# Patient Record
Sex: MALE | Race: WHITE | NOT HISPANIC OR LATINO | Employment: FULL TIME | URBAN - METROPOLITAN AREA
[De-identification: names, ages, dates, MRNs, and addresses within clinical notes are randomized per-mention and may not be internally consistent; named-entity substitution may affect disease eponyms.]

---

## 2022-04-07 ENCOUNTER — HOSPITAL ENCOUNTER (OUTPATIENT)
Dept: CARDIOLOGY | Facility: HOSPITAL | Age: 42
Discharge: HOME | End: 2022-04-07

## 2022-04-07 ENCOUNTER — HOSPITAL ENCOUNTER (OUTPATIENT)
Dept: GENERAL RADIOLOGY | Facility: HOSPITAL | Age: 42
Discharge: HOME | End: 2022-04-07

## 2022-04-07 ENCOUNTER — LAB (OUTPATIENT)
Dept: LAB | Facility: HOSPITAL | Age: 42
End: 2022-04-07

## 2022-04-07 ENCOUNTER — OFFICE VISIT (OUTPATIENT)
Dept: PODIATRY | Facility: CLINIC | Age: 42
End: 2022-04-07

## 2022-04-07 VITALS
BODY MASS INDEX: 35.28 KG/M2 | SYSTOLIC BLOOD PRESSURE: 112 MMHG | HEIGHT: 71 IN | WEIGHT: 252 LBS | DIASTOLIC BLOOD PRESSURE: 78 MMHG | HEART RATE: 92 BPM

## 2022-04-07 DIAGNOSIS — S82.851A CLOSED DISPLACED TRIMALLEOLAR FRACTURE OF RIGHT ANKLE, INITIAL ENCOUNTER: ICD-10-CM

## 2022-04-07 DIAGNOSIS — M25.571 ACUTE RIGHT ANKLE PAIN: Primary | ICD-10-CM

## 2022-04-07 LAB
ANION GAP SERPL CALCULATED.3IONS-SCNC: 9 MMOL/L (ref 5–15)
BUN SERPL-MCNC: 15 MG/DL (ref 6–20)
BUN/CREAT SERPL: 17.9 (ref 7–25)
CALCIUM SPEC-SCNC: 9.4 MG/DL (ref 8.6–10.5)
CHLORIDE SERPL-SCNC: 104 MMOL/L (ref 98–107)
CO2 SERPL-SCNC: 24 MMOL/L (ref 22–29)
CREAT SERPL-MCNC: 0.84 MG/DL (ref 0.76–1.27)
DEPRECATED RDW RBC AUTO: 42.4 FL (ref 37–54)
EGFRCR SERPLBLD CKD-EPI 2021: 111.7 ML/MIN/1.73
ERYTHROCYTE [DISTWIDTH] IN BLOOD BY AUTOMATED COUNT: 13.1 % (ref 12.3–15.4)
GLUCOSE SERPL-MCNC: 71 MG/DL (ref 65–99)
HCT VFR BLD AUTO: 44.5 % (ref 37.5–51)
HGB BLD-MCNC: 14.6 G/DL (ref 13–17.7)
MCH RBC QN AUTO: 28.8 PG (ref 26.6–33)
MCHC RBC AUTO-ENTMCNC: 32.8 G/DL (ref 31.5–35.7)
MCV RBC AUTO: 87.8 FL (ref 79–97)
MRSA DNA SPEC QL NAA+PROBE: NORMAL
PLATELET # BLD AUTO: 235 10*3/MM3 (ref 140–450)
PMV BLD AUTO: 11.6 FL (ref 6–12)
POTASSIUM SERPL-SCNC: 4.2 MMOL/L (ref 3.5–5.2)
RBC # BLD AUTO: 5.07 10*6/MM3 (ref 4.14–5.8)
SARS-COV-2 RNA PNL SPEC NAA+PROBE: NOT DETECTED
SODIUM SERPL-SCNC: 137 MMOL/L (ref 136–145)
WBC NRBC COR # BLD: 7.08 10*3/MM3 (ref 3.4–10.8)

## 2022-04-07 PROCEDURE — 85027 COMPLETE CBC AUTOMATED: CPT

## 2022-04-07 PROCEDURE — C9803 HOPD COVID-19 SPEC COLLECT: HCPCS

## 2022-04-07 PROCEDURE — 80048 BASIC METABOLIC PNL TOTAL CA: CPT

## 2022-04-07 PROCEDURE — 93005 ELECTROCARDIOGRAM TRACING: CPT | Performed by: PODIATRIST

## 2022-04-07 PROCEDURE — 36415 COLL VENOUS BLD VENIPUNCTURE: CPT

## 2022-04-07 PROCEDURE — U0003 INFECTIOUS AGENT DETECTION BY NUCLEIC ACID (DNA OR RNA); SEVERE ACUTE RESPIRATORY SYNDROME CORONAVIRUS 2 (SARS-COV-2) (CORONAVIRUS DISEASE [COVID-19]), AMPLIFIED PROBE TECHNIQUE, MAKING USE OF HIGH THROUGHPUT TECHNOLOGIES AS DESCRIBED BY CMS-2020-01-R: HCPCS

## 2022-04-07 PROCEDURE — 93010 ELECTROCARDIOGRAM REPORT: CPT | Performed by: INTERNAL MEDICINE

## 2022-04-07 PROCEDURE — 87641 MR-STAPH DNA AMP PROBE: CPT

## 2022-04-07 PROCEDURE — 71046 X-RAY EXAM CHEST 2 VIEWS: CPT

## 2022-04-07 PROCEDURE — 99204 OFFICE O/P NEW MOD 45 MIN: CPT | Performed by: PODIATRIST

## 2022-04-07 RX ORDER — B-COMPLEX WITH VITAMIN C
TABLET ORAL
COMMUNITY

## 2022-04-07 RX ORDER — MULTIVIT WITH MINERALS/LUTEIN
250 TABLET ORAL DAILY
COMMUNITY

## 2022-04-07 NOTE — PROGRESS NOTES
04/07/2022  Foot and Ankle Surgery - New Patient   Provider: Dr. David Taylor DPM  Location: [facility]    Subjective:  Munir Cuadra is a 41 y.o. male.     Chief Complaint   Patient presents with   • Right Ankle - Pain   • Initial Evaluation     No pcp       HPI: Patient is a 41-year-old male that presents after a workplace injury last week.  He states that last Wednesday he slipped and landed awkwardly involving his right lower extremity.  He describes twisting injury.  He did report to the urgent care center where imaging was performed showing fracture involving the fibula.  He was placed into a cam boot and referred to me.  Patient has been mostly off weightbearing with crutch assistance.  He continues to have discomfort involving the ankle and lower leg.  He has noticed some improvement in pain.  Denies any previous injuries involving the right lower extremity.  Majority of his discomfort is located to the lateral aspect of the ankle.    Allergies   Allergen Reactions   • Tramadol Itching   • Erythromycin GI Intolerance       Past Medical History:   Diagnosis Date   • Asthma        History reviewed. No pertinent surgical history.    Family History   Problem Relation Age of Onset   • No Known Problems Mother    • No Known Problems Father        Social History     Socioeconomic History   • Marital status:    Tobacco Use   • Smoking status: Never Smoker   • Smokeless tobacco: Never Used   • Tobacco comment: nopassive exp   Vaping Use   • Vaping Use: Never used   Substance and Sexual Activity   • Alcohol use: Never   • Drug use: Never   • Sexual activity: Defer        Current Outpatient Medications on File Prior to Visit   Medication Sig Dispense Refill   • CBD (cannabidiol) oral oil Take  by mouth.     • Cetirizine HCl 10 MG capsule Take 10 mg by mouth Daily.     • Cholecalciferol 25 MCG (1000 UT) capsule Take 1,000 Units by mouth Daily.     • esomeprazole (NexIUM) 20 MG packet Take 20 mg by  "mouth Daily.     • fluticasone (FLONASE) 50 MCG/ACT nasal spray 2 sprays into the nostril(s) as directed by provider Daily. 16 g 0   • KRILL OIL PO Take  by mouth.     • Magnesium 100 MG capsule Take  by mouth.     • multivitamin (THERAGRAN) tablet tablet Take 1 tablet by mouth Daily.     • vitamin C (ASCORBIC ACID) 250 MG tablet Take 250 mg by mouth Daily.     • Zinc 100 MG tablet Take  by mouth.       No current facility-administered medications on file prior to visit.       Review of Systems:  General: Denies fever, chills, fatigue, and weakness.  Eyes: Denies vision loss, blurry vision, and excessive redness.  ENT: Denies hearing issues and difficulty swallowing.  Cardiovascular: Denies palpitations, chest pain, or syncopal episodes.  Respiratory: Denies shortness of breath, wheezing, and coughing.  GI: Denies abdominal pain, nausea, and vomiting.   : Denies frequency, hematuria, and urgency.  Musculoskeletal: + Ankle pain after injury  Derm: Denies rash, open wounds, or suspicious lesions.  Neuro: Denies headaches, numbness, loss of coordination, and tremors.  Psych: Denies anxiety and depression.  Endocrine: Denies temperature intolerance and changes in appetite.  Heme: Denies bleeding disorders or abnormal bruising.     Objective   /78   Pulse 92   Ht 180.3 cm (71\")   Wt 114 kg (252 lb)   BMI 35.15 kg/m²     Foot/Ankle Exam:       General:   Appearance: appears stated age and healthy    Orientation: AAOx3    Affect: appropriate    Assistance: crutches    Shoe Gear:  CAM boot    VASCULAR      Right Foot Vascularity   Normal vascular exam    Dorsalis pedis:  2+  Posterior tibial:  2+  Skin Temperature: warm    Edema Gradin+  CFT:  < 3 seconds  Pedal Hair Growth:  Present  Varicosities: none        NEUROLOGIC     Right Foot Neurologic   Light touch sensation:  Normal  Hot/Cold sensation: normal    Achilles reflex:  2+     MUSCULOSKELETAL      Right Foot Musculoskeletal   Ecchymosis:  Dorsal foot " and ankle joint (Lower leg ecchymosis)  Tenderness: dorsal foot, lateral malleolus and anterior tibiotalar joint line    Arch:  Normal     DERMATOLOGIC     Right Foot Dermatologic   Skin: skin intact        Right Foot Additional Comments Muscle strength, range of motion, and instability testing deferred secondary to guarding.  No resting deformity.  Swelling and inflammation to the ankle region.  No proximal fibular pain.      Assessment/Plan   Diagnoses and all orders for this visit:    1. Acute right ankle pain (Primary)    2. Closed displaced trimalleolar fracture of right ankle, initial encounter  -     COVID PRE-OP / PRE-PROCEDURE SCREENING ORDER (NO ISOLATION) - Swab, Nasopharynx; Future  -     CBC (No Diff); Future  -     Basic Metabolic Panel; Future  -     ECG 12 Lead; Future  -     XR Chest 2 View; Future  -     Case Request; Standing  -     Case Request    Other orders  -     Follow Anesthesia Guidelines / Standing Orders; Future  -     Obtain Informed Consent; Future  -     Provide NPO Instructions to Patient; Future  -     Chlorhexidine Skin Prep; Future      Patient presents after recent injury involving the right lower extremity at work.  Imaging was independently reviewed showing spiral type fracture involving the lateral malleolus along with small posterior malleolar fragment as well as gapping involving the medial gutter indicating deltoid insufficiency.  Findings are consistent with trimalleolar ankle fracture equivalent.  I did discuss the imaging, diagnosis, and treatment options with the patient.  Given displacement and unstable nature of the fracture, I do feel that the most appropriate option is to proceed with open reduction internal fixation.  I did review the procedure, risk, goals, and recovery with patient at length.  He understands that he will require nonweightbearing and time off work.  He understands and agrees and would like to proceed in the near future.  All questions were  answered to patient satisfaction.  Greater than 45 minutes was spent before, during, and after evaluation for patient care.    Orders Placed This Encounter   Procedures   • COVID PRE-OP / PRE-PROCEDURE SCREENING ORDER (NO ISOLATION) - Swab, Nasopharynx     Standing Status:   Future     Standing Expiration Date:   4/7/2023     Order Specific Question:   Please select your location:     Answer:   [facility]     Order Specific Question:   COVID Screening Order:     Answer:   In-House: EMERGENT/PREOP-CEPHEID, 3-4 HR TAT [[id number]]     Order Specific Question:   Previously tested for COVID-19?     Answer:   Unknown     Order Specific Question:   Employed in healthcare setting?     Answer:   No     Order Specific Question:   Symptomatic for COVID-19 as defined by CDC?     Answer:   No     Order Specific Question:   Hospitalized for COVID-19?     Answer:   No     Order Specific Question:   Admitted to ICU for COVID-19?     Answer:   No     Order Specific Question:   Resident in a congregate (group) care setting?     Answer:   No     Order Specific Question:   Release to patient     Answer:   Immediate   • XR Chest 2 View     Standing Status:   Future     Standing Expiration Date:   4/7/2023     Order Specific Question:   Reason for Exam:     Answer:   Preop   • CBC (No Diff)     Standing Status:   Future     Standing Expiration Date:   4/7/2023     Order Specific Question:   Release to patient     Answer:   Immediate   • Basic Metabolic Panel     Standing Status:   Future     Standing Expiration Date:   4/7/2023     Order Specific Question:   Release to patient     Answer:   Immediate   • Follow Anesthesia Guidelines / Standing Orders     Standing Status:   Future   • Obtain Informed Consent     Standing Status:   Future     Order Specific Question:   Informed Consent Given For     Answer:   Open reduction and internal fixation of trimalleolar ankle fracture equivalent and possible syndesmotic repair to the right lower  extremity   • Provide NPO Instructions to Patient     Standing Status:   Future   • Chlorhexidine Skin Prep     Chlorhexidine Skin Prep and Instructions For All Patients Having A Procedure Requiring an Outward Incision if Not Allergic. If Allergic, Give Antibacterial Skin Wipes and Instructions. Do Not Use For Facial Cases or on Any Mucus Membranes.     Standing Status:   Future   • ECG 12 Lead     Standing Status:   Future     Standing Expiration Date:   4/7/2023     Order Specific Question:   Reason for Exam:     Answer:   Preop        Note is dictated utilizing voice recognition software. Unfortunately this leads to occasional typographical errors. I apologize in advance if the situation occurs. If questions occur please do not hesitate to call our office.

## 2022-04-07 NOTE — H&P (VIEW-ONLY)
04/07/2022  Foot and Ankle Surgery - New Patient   Provider: Dr. David Taylor DPM  Location: [facility]    Subjective:  Munir Cuadra is a 41 y.o. male.     Chief Complaint   Patient presents with   • Right Ankle - Pain   • Initial Evaluation     No pcp       HPI: Patient is a 41-year-old male that presents after a workplace injury last week.  He states that last Wednesday he slipped and landed awkwardly involving his right lower extremity.  He describes twisting injury.  He did report to the urgent care center where imaging was performed showing fracture involving the fibula.  He was placed into a cam boot and referred to me.  Patient has been mostly off weightbearing with crutch assistance.  He continues to have discomfort involving the ankle and lower leg.  He has noticed some improvement in pain.  Denies any previous injuries involving the right lower extremity.  Majority of his discomfort is located to the lateral aspect of the ankle.    Allergies   Allergen Reactions   • Tramadol Itching   • Erythromycin GI Intolerance       Past Medical History:   Diagnosis Date   • Asthma        History reviewed. No pertinent surgical history.    Family History   Problem Relation Age of Onset   • No Known Problems Mother    • No Known Problems Father        Social History     Socioeconomic History   • Marital status:    Tobacco Use   • Smoking status: Never Smoker   • Smokeless tobacco: Never Used   • Tobacco comment: nopassive exp   Vaping Use   • Vaping Use: Never used   Substance and Sexual Activity   • Alcohol use: Never   • Drug use: Never   • Sexual activity: Defer        Current Outpatient Medications on File Prior to Visit   Medication Sig Dispense Refill   • CBD (cannabidiol) oral oil Take  by mouth.     • Cetirizine HCl 10 MG capsule Take 10 mg by mouth Daily.     • Cholecalciferol 25 MCG (1000 UT) capsule Take 1,000 Units by mouth Daily.     • esomeprazole (NexIUM) 20 MG packet Take 20 mg by  "mouth Daily.     • fluticasone (FLONASE) 50 MCG/ACT nasal spray 2 sprays into the nostril(s) as directed by provider Daily. 16 g 0   • KRILL OIL PO Take  by mouth.     • Magnesium 100 MG capsule Take  by mouth.     • multivitamin (THERAGRAN) tablet tablet Take 1 tablet by mouth Daily.     • vitamin C (ASCORBIC ACID) 250 MG tablet Take 250 mg by mouth Daily.     • Zinc 100 MG tablet Take  by mouth.       No current facility-administered medications on file prior to visit.       Review of Systems:  General: Denies fever, chills, fatigue, and weakness.  Eyes: Denies vision loss, blurry vision, and excessive redness.  ENT: Denies hearing issues and difficulty swallowing.  Cardiovascular: Denies palpitations, chest pain, or syncopal episodes.  Respiratory: Denies shortness of breath, wheezing, and coughing.  GI: Denies abdominal pain, nausea, and vomiting.   : Denies frequency, hematuria, and urgency.  Musculoskeletal: + Ankle pain after injury  Derm: Denies rash, open wounds, or suspicious lesions.  Neuro: Denies headaches, numbness, loss of coordination, and tremors.  Psych: Denies anxiety and depression.  Endocrine: Denies temperature intolerance and changes in appetite.  Heme: Denies bleeding disorders or abnormal bruising.     Objective   /78   Pulse 92   Ht 180.3 cm (71\")   Wt 114 kg (252 lb)   BMI 35.15 kg/m²     Foot/Ankle Exam:       General:   Appearance: appears stated age and healthy    Orientation: AAOx3    Affect: appropriate    Assistance: crutches    Shoe Gear:  CAM boot    VASCULAR      Right Foot Vascularity   Normal vascular exam    Dorsalis pedis:  2+  Posterior tibial:  2+  Skin Temperature: warm    Edema Gradin+  CFT:  < 3 seconds  Pedal Hair Growth:  Present  Varicosities: none        NEUROLOGIC     Right Foot Neurologic   Light touch sensation:  Normal  Hot/Cold sensation: normal    Achilles reflex:  2+     MUSCULOSKELETAL      Right Foot Musculoskeletal   Ecchymosis:  Dorsal foot " and ankle joint (Lower leg ecchymosis)  Tenderness: dorsal foot, lateral malleolus and anterior tibiotalar joint line    Arch:  Normal     DERMATOLOGIC     Right Foot Dermatologic   Skin: skin intact        Right Foot Additional Comments Muscle strength, range of motion, and instability testing deferred secondary to guarding.  No resting deformity.  Swelling and inflammation to the ankle region.  No proximal fibular pain.      Assessment/Plan   Diagnoses and all orders for this visit:    1. Acute right ankle pain (Primary)    2. Closed displaced trimalleolar fracture of right ankle, initial encounter  -     COVID PRE-OP / PRE-PROCEDURE SCREENING ORDER (NO ISOLATION) - Swab, Nasopharynx; Future  -     CBC (No Diff); Future  -     Basic Metabolic Panel; Future  -     ECG 12 Lead; Future  -     XR Chest 2 View; Future  -     Case Request; Standing  -     Case Request    Other orders  -     Follow Anesthesia Guidelines / Standing Orders; Future  -     Obtain Informed Consent; Future  -     Provide NPO Instructions to Patient; Future  -     Chlorhexidine Skin Prep; Future      Patient presents after recent injury involving the right lower extremity at work.  Imaging was independently reviewed showing spiral type fracture involving the lateral malleolus along with small posterior malleolar fragment as well as gapping involving the medial gutter indicating deltoid insufficiency.  Findings are consistent with trimalleolar ankle fracture equivalent.  I did discuss the imaging, diagnosis, and treatment options with the patient.  Given displacement and unstable nature of the fracture, I do feel that the most appropriate option is to proceed with open reduction internal fixation.  I did review the procedure, risk, goals, and recovery with patient at length.  He understands that he will require nonweightbearing and time off work.  He understands and agrees and would like to proceed in the near future.  All questions were  answered to patient satisfaction.  Greater than 45 minutes was spent before, during, and after evaluation for patient care.    Orders Placed This Encounter   Procedures   • COVID PRE-OP / PRE-PROCEDURE SCREENING ORDER (NO ISOLATION) - Swab, Nasopharynx     Standing Status:   Future     Standing Expiration Date:   4/7/2023     Order Specific Question:   Please select your location:     Answer:   [facility]     Order Specific Question:   COVID Screening Order:     Answer:   In-House: EMERGENT/PREOP-CEPHEID, 3-4 HR TAT [[id number]]     Order Specific Question:   Previously tested for COVID-19?     Answer:   Unknown     Order Specific Question:   Employed in healthcare setting?     Answer:   No     Order Specific Question:   Symptomatic for COVID-19 as defined by CDC?     Answer:   No     Order Specific Question:   Hospitalized for COVID-19?     Answer:   No     Order Specific Question:   Admitted to ICU for COVID-19?     Answer:   No     Order Specific Question:   Resident in a congregate (group) care setting?     Answer:   No     Order Specific Question:   Release to patient     Answer:   Immediate   • XR Chest 2 View     Standing Status:   Future     Standing Expiration Date:   4/7/2023     Order Specific Question:   Reason for Exam:     Answer:   Preop   • CBC (No Diff)     Standing Status:   Future     Standing Expiration Date:   4/7/2023     Order Specific Question:   Release to patient     Answer:   Immediate   • Basic Metabolic Panel     Standing Status:   Future     Standing Expiration Date:   4/7/2023     Order Specific Question:   Release to patient     Answer:   Immediate   • Follow Anesthesia Guidelines / Standing Orders     Standing Status:   Future   • Obtain Informed Consent     Standing Status:   Future     Order Specific Question:   Informed Consent Given For     Answer:   Open reduction and internal fixation of trimalleolar ankle fracture equivalent and possible syndesmotic repair to the right lower  extremity   • Provide NPO Instructions to Patient     Standing Status:   Future   • Chlorhexidine Skin Prep     Chlorhexidine Skin Prep and Instructions For All Patients Having A Procedure Requiring an Outward Incision if Not Allergic. If Allergic, Give Antibacterial Skin Wipes and Instructions. Do Not Use For Facial Cases or on Any Mucus Membranes.     Standing Status:   Future   • ECG 12 Lead     Standing Status:   Future     Standing Expiration Date:   4/7/2023     Order Specific Question:   Reason for Exam:     Answer:   Preop        Note is dictated utilizing voice recognition software. Unfortunately this leads to occasional typographical errors. I apologize in advance if the situation occurs. If questions occur please do not hesitate to call our office.

## 2022-04-08 ENCOUNTER — HOSPITAL ENCOUNTER (OUTPATIENT)
Facility: HOSPITAL | Age: 42
Setting detail: HOSPITAL OUTPATIENT SURGERY
Discharge: HOME | End: 2022-04-08
Attending: PODIATRIST | Admitting: PODIATRIST

## 2022-04-08 ENCOUNTER — APPOINTMENT (OUTPATIENT)
Dept: GENERAL RADIOLOGY | Facility: HOSPITAL | Age: 42
End: 2022-04-08

## 2022-04-08 ENCOUNTER — ANESTHESIA EVENT (OUTPATIENT)
Dept: PERIOP | Facility: HOSPITAL | Age: 42
End: 2022-04-08

## 2022-04-08 ENCOUNTER — ANESTHESIA (OUTPATIENT)
Dept: PERIOP | Facility: HOSPITAL | Age: 42
End: 2022-04-08

## 2022-04-08 VITALS
BODY MASS INDEX: 36.08 KG/M2 | DIASTOLIC BLOOD PRESSURE: 89 MMHG | WEIGHT: 252 LBS | SYSTOLIC BLOOD PRESSURE: 134 MMHG | TEMPERATURE: 97.4 F | HEART RATE: 93 BPM | HEIGHT: 70 IN | RESPIRATION RATE: 16 BRPM | OXYGEN SATURATION: 92 %

## 2022-04-08 DIAGNOSIS — S82.851A CLOSED DISPLACED TRIMALLEOLAR FRACTURE OF RIGHT ANKLE, INITIAL ENCOUNTER: ICD-10-CM

## 2022-04-08 PROCEDURE — 27822 TREATMENT OF ANKLE FRACTURE: CPT | Performed by: PODIATRIST

## 2022-04-08 PROCEDURE — 76000 FLUOROSCOPY <1 HR PHYS/QHP: CPT | Performed by: PODIATRIST

## 2022-04-08 PROCEDURE — 25010000002 PROPOFOL 200 MG/20ML EMULSION: Performed by: ANESTHESIOLOGIST ASSISTANT

## 2022-04-08 PROCEDURE — C1713 ANCHOR/SCREW BN/BN,TIS/BN: HCPCS | Performed by: PODIATRIST

## 2022-04-08 PROCEDURE — 25010000002 ROPIVACAINE PER 1 MG: Performed by: ANESTHESIOLOGY

## 2022-04-08 PROCEDURE — 76942 ECHO GUIDE FOR BIOPSY: CPT | Performed by: PODIATRIST

## 2022-04-08 PROCEDURE — 25010000002 MIDAZOLAM PER 1 MG: Performed by: ANESTHESIOLOGY

## 2022-04-08 PROCEDURE — 73600 X-RAY EXAM OF ANKLE: CPT

## 2022-04-08 PROCEDURE — 25010000002 ONDANSETRON PER 1 MG: Performed by: ANESTHESIOLOGIST ASSISTANT

## 2022-04-08 PROCEDURE — 76000 FLUOROSCOPY <1 HR PHYS/QHP: CPT

## 2022-04-08 PROCEDURE — 25010000002 FENTANYL CITRATE (PF) 50 MCG/ML SOLUTION: Performed by: ANESTHESIOLOGY

## 2022-04-08 PROCEDURE — 25010000002 DEXAMETHASONE PER 1 MG: Performed by: ANESTHESIOLOGIST ASSISTANT

## 2022-04-08 PROCEDURE — 25010000002 FENTANYL CITRATE (PF) 100 MCG/2ML SOLUTION: Performed by: ANESTHESIOLOGIST ASSISTANT

## 2022-04-08 PROCEDURE — 25010000002 KETOROLAC TROMETHAMINE PER 15 MG: Performed by: ANESTHESIOLOGIST ASSISTANT

## 2022-04-08 PROCEDURE — 27768 CLTX POST ANKLE FX W/MNPJ: CPT | Performed by: PODIATRIST

## 2022-04-08 DEVICE — IMPLANTABLE DEVICE
Type: IMPLANTABLE DEVICE | Site: ANKLE | Status: FUNCTIONAL
Brand: ORTHOLOC 3DI

## 2022-04-08 DEVICE — IMPLANTABLE DEVICE
Type: IMPLANTABLE DEVICE | Site: ANKLE | Status: FUNCTIONAL
Brand: DART-FIRE

## 2022-04-08 DEVICE — IMPLANTABLE DEVICE
Type: IMPLANTABLE DEVICE | Site: ANKLE | Status: FUNCTIONAL
Brand: ORTHOLOC 3DI PLATING SYSTEM

## 2022-04-08 DEVICE — K-WIRE BLUNT/TROCAR: Type: IMPLANTABLE DEVICE | Site: ANKLE | Status: FUNCTIONAL

## 2022-04-08 DEVICE — IMPLANTABLE DEVICE
Type: IMPLANTABLE DEVICE | Site: ANKLE | Status: FUNCTIONAL
Brand: ORTHOLOC

## 2022-04-08 RX ORDER — DEXAMETHASONE SODIUM PHOSPHATE 4 MG/ML
INJECTION, SOLUTION INTRA-ARTICULAR; INTRALESIONAL; INTRAMUSCULAR; INTRAVENOUS; SOFT TISSUE AS NEEDED
Status: DISCONTINUED | OUTPATIENT
Start: 2022-04-08 | End: 2022-04-08 | Stop reason: SURG

## 2022-04-08 RX ORDER — IPRATROPIUM BROMIDE AND ALBUTEROL SULFATE 2.5; .5 MG/3ML; MG/3ML
3 SOLUTION RESPIRATORY (INHALATION) ONCE AS NEEDED
Status: DISCONTINUED | OUTPATIENT
Start: 2022-04-08 | End: 2022-04-08 | Stop reason: HOSPADM

## 2022-04-08 RX ORDER — SODIUM CHLORIDE, SODIUM LACTATE, POTASSIUM CHLORIDE, CALCIUM CHLORIDE 600; 310; 30; 20 MG/100ML; MG/100ML; MG/100ML; MG/100ML
1000 INJECTION, SOLUTION INTRAVENOUS CONTINUOUS
Status: DISCONTINUED | OUTPATIENT
Start: 2022-04-08 | End: 2022-04-08 | Stop reason: HOSPADM

## 2022-04-08 RX ORDER — IBUPROFEN 800 MG/1
800 TABLET ORAL EVERY 6 HOURS PRN
COMMUNITY

## 2022-04-08 RX ORDER — FENTANYL CITRATE 50 UG/ML
INJECTION, SOLUTION INTRAMUSCULAR; INTRAVENOUS AS NEEDED
Status: DISCONTINUED | OUTPATIENT
Start: 2022-04-08 | End: 2022-04-08 | Stop reason: SURG

## 2022-04-08 RX ORDER — NALOXONE HCL 0.4 MG/ML
0.4 VIAL (ML) INJECTION AS NEEDED
Status: DISCONTINUED | OUTPATIENT
Start: 2022-04-08 | End: 2022-04-08 | Stop reason: HOSPADM

## 2022-04-08 RX ORDER — DEXAMETHASONE SODIUM PHOSPHATE 4 MG/ML
INJECTION, SOLUTION INTRA-ARTICULAR; INTRALESIONAL; INTRAMUSCULAR; INTRAVENOUS; SOFT TISSUE
Status: DISCONTINUED | OUTPATIENT
Start: 2022-04-08 | End: 2022-04-08 | Stop reason: SURG

## 2022-04-08 RX ORDER — ROPIVACAINE HYDROCHLORIDE 5 MG/ML
INJECTION, SOLUTION EPIDURAL; INFILTRATION; PERINEURAL
Status: DISCONTINUED | OUTPATIENT
Start: 2022-04-08 | End: 2022-04-08 | Stop reason: SURG

## 2022-04-08 RX ORDER — PROPOFOL 10 MG/ML
INJECTION, EMULSION INTRAVENOUS AS NEEDED
Status: DISCONTINUED | OUTPATIENT
Start: 2022-04-08 | End: 2022-04-08 | Stop reason: SURG

## 2022-04-08 RX ORDER — LIDOCAINE HYDROCHLORIDE 10 MG/ML
INJECTION, SOLUTION EPIDURAL; INFILTRATION; INTRACAUDAL; PERINEURAL AS NEEDED
Status: DISCONTINUED | OUTPATIENT
Start: 2022-04-08 | End: 2022-04-08 | Stop reason: SURG

## 2022-04-08 RX ORDER — ONDANSETRON 2 MG/ML
INJECTION INTRAMUSCULAR; INTRAVENOUS AS NEEDED
Status: DISCONTINUED | OUTPATIENT
Start: 2022-04-08 | End: 2022-04-08 | Stop reason: SURG

## 2022-04-08 RX ORDER — MIDAZOLAM HYDROCHLORIDE 1 MG/ML
INJECTION INTRAMUSCULAR; INTRAVENOUS
Status: DISCONTINUED | OUTPATIENT
Start: 2022-04-08 | End: 2022-04-08 | Stop reason: SURG

## 2022-04-08 RX ORDER — FENTANYL CITRATE 50 UG/ML
INJECTION, SOLUTION INTRAMUSCULAR; INTRAVENOUS
Status: DISCONTINUED | OUTPATIENT
Start: 2022-04-08 | End: 2022-04-08 | Stop reason: SURG

## 2022-04-08 RX ORDER — SODIUM CHLORIDE 0.9 % (FLUSH) 0.9 %
10 SYRINGE (ML) INJECTION AS NEEDED
Status: DISCONTINUED | OUTPATIENT
Start: 2022-04-08 | End: 2022-04-08 | Stop reason: HOSPADM

## 2022-04-08 RX ORDER — ONDANSETRON 2 MG/ML
4 INJECTION INTRAMUSCULAR; INTRAVENOUS ONCE AS NEEDED
Status: DISCONTINUED | OUTPATIENT
Start: 2022-04-08 | End: 2022-04-08 | Stop reason: HOSPADM

## 2022-04-08 RX ORDER — BUDESONIDE AND FORMOTEROL FUMARATE DIHYDRATE 160; 4.5 UG/1; UG/1
2 AEROSOL RESPIRATORY (INHALATION)
COMMUNITY

## 2022-04-08 RX ORDER — KETOROLAC TROMETHAMINE 30 MG/ML
INJECTION, SOLUTION INTRAMUSCULAR; INTRAVENOUS AS NEEDED
Status: DISCONTINUED | OUTPATIENT
Start: 2022-04-08 | End: 2022-04-08 | Stop reason: SURG

## 2022-04-08 RX ORDER — HYDROCODONE BITARTRATE AND ACETAMINOPHEN 7.5; 325 MG/1; MG/1
1 TABLET ORAL EVERY 6 HOURS PRN
Qty: 28 TABLET | Refills: 0 | Status: SHIPPED | OUTPATIENT
Start: 2022-04-08 | End: 2022-05-09

## 2022-04-08 RX ADMIN — SODIUM CHLORIDE, POTASSIUM CHLORIDE, SODIUM LACTATE AND CALCIUM CHLORIDE 1000 ML: 600; 310; 30; 20 INJECTION, SOLUTION INTRAVENOUS at 09:25

## 2022-04-08 RX ADMIN — KETOROLAC TROMETHAMINE 30 MG: 30 INJECTION, SOLUTION INTRAMUSCULAR at 11:45

## 2022-04-08 RX ADMIN — ONDANSETRON 4 MG: 2 INJECTION INTRAMUSCULAR; INTRAVENOUS at 11:30

## 2022-04-08 RX ADMIN — CEFAZOLIN SODIUM 2 G: 1 INJECTION, POWDER, FOR SOLUTION INTRAMUSCULAR; INTRAVENOUS at 10:46

## 2022-04-08 RX ADMIN — FENTANYL CITRATE 100 MCG: 50 INJECTION, SOLUTION INTRAMUSCULAR; INTRAVENOUS at 10:24

## 2022-04-08 RX ADMIN — ROPIVACAINE HYDROCHLORIDE 60 ML: 5 INJECTION EPIDURAL; INFILTRATION; PERINEURAL at 10:24

## 2022-04-08 RX ADMIN — FENTANYL CITRATE 50 MCG: 50 INJECTION INTRAMUSCULAR; INTRAVENOUS at 10:51

## 2022-04-08 RX ADMIN — PROPOFOL 200 MG: 10 INJECTION, EMULSION INTRAVENOUS at 10:42

## 2022-04-08 RX ADMIN — DEXAMETHASONE SODIUM PHOSPHATE 8 MG: 4 INJECTION, SOLUTION INTRA-ARTICULAR; INTRALESIONAL; INTRAMUSCULAR; INTRAVENOUS; SOFT TISSUE at 10:24

## 2022-04-08 RX ADMIN — DEXAMETHASONE SODIUM PHOSPHATE 4 MG: 4 INJECTION, SOLUTION INTRAMUSCULAR; INTRAVENOUS at 10:46

## 2022-04-08 RX ADMIN — FENTANYL CITRATE 50 MCG: 50 INJECTION INTRAMUSCULAR; INTRAVENOUS at 10:53

## 2022-04-08 RX ADMIN — MIDAZOLAM 2 MG: 1 INJECTION INTRAMUSCULAR; INTRAVENOUS at 10:24

## 2022-04-08 RX ADMIN — LIDOCAINE HYDROCHLORIDE 30 MG: 10 INJECTION, SOLUTION EPIDURAL; INFILTRATION; INTRACAUDAL; PERINEURAL at 10:42

## 2022-04-08 NOTE — ANESTHESIA POSTPROCEDURE EVALUATION
Patient: Munir Cuadra    Procedure Summary     Date: 04/08/22 Room / Location: [facility] OR 08 / [facility] MAIN OR    Anesthesia Start: 1034 Anesthesia Stop: 1152    Procedure: ANKLE OPEN REDUCTION INTERNAL FIXATION (Right Ankle) Diagnosis:       Closed displaced trimalleolar fracture of right ankle, initial encounter      (Closed displaced trimalleolar fracture of right ankle, initial encounter [S82.351K])    Surgeons: DREA Taylor DPM Provider: Femi Holt MD    Anesthesia Type: general with block ASA Status: 2          Anesthesia Type: general with block    Vitals  Vitals Value Taken Time   /89 04/08/22 1238   Temp 97.6 °F (36.4 °C) 04/08/22 1236   Pulse 97 04/08/22 1239   Resp 15 04/08/22 1236   SpO2 95 % 04/08/22 1239   Vitals shown include unvalidated device data.        Post Anesthesia Care and Evaluation    Patient location during evaluation: PACU  Patient participation: complete - patient participated  Level of consciousness: awake  Pain management: adequate  Airway patency: patent  Anesthetic complications: No anesthetic complications  PONV Status: none  Cardiovascular status: acceptable  Respiratory status: acceptable  Hydration status: acceptable    Comments: Patient seen and examined postoperatively; vital signs stable; SpO2 greater than or equal to 90%; cardiopulmonary status stable; nausea/vomiting adequately controlled; pain adequately controlled; no apparent anesthesia complications; patient discharged from anesthesia care when discharge criteria were met

## 2022-04-08 NOTE — OP NOTE
Operative Note   Foot and Ankle Surgery   Provider: Dr. David Taylor   Location: [facility]      Procedure:  1.  Open reduction internal fixation lateral malleolus fracture, right  2.  Closed reduction with manipulation of posterior malleolar fracture, right    Pre-operative Diagnosis:   1.  Closed displaced trimalleolar ankle fracture equivalent, right    Post-operative Diagnosis: Same    Surgeon: David Taylor    Assistant: None    Anesthesia: General with block    Implants: [employer] plate and 3.5 millimeter screws of various lengths; 3.5 mm cannulated screws x2    Findings: Primary spiral type fracture involving the lateral malleolus with significant Wagstaffe type fracture and mild comminution.  Posterior malleolus fracture is well reduced without the need for fixation.  No obvious deltoid or syndesmotic disruption    Specimen: None    Blood Loss: Less than 5cc    Complications: None    Post Op Plan: Discharge home.  Strict nonweightbearing activity.  Follow-up with me in 2 weeks    Summary:    Patient is a 41-year-old male that has been seen in office for injury involving his right lower extremity that he sustained at work.  Imaging was reviewed showing mildly displaced trimalleolar ankle fracture equivalent.  I did review the imaging, diagnosis, and treatment options.  We did discuss proceeding with open reduction internal fixation of the ankle fracture.  He understands that he will require nonweightbearing and prolonged decreased activity after surgery.  Patient would like to proceed.    Procedure, risks, complications, and goals were discussed with the patient at bedside.  Risks include but are not limited to infection, complications from anesthesia (including death), chronic pain or numbness, hematoma/seroma, deep vein thrombosis, wound complications, and potential for additional surgical procedures.  Patient understands and elects to proceed with surgery at this time. Informed consent was  obtained before proceeding to the operating suite.  All questions were answered to the patient's satisfaction. No guarantees or assurances were given or implied.    Procedure:    Patient was brought to the operating room placed on operative table in supine position.  Once adequate general anesthesia was administered, a pneumatic tourniquet was placed about the patient's right thigh.  The right lower extremity was scrubbed prepped and draped in usual sterile fashion.  The limb was elevated and exsanguinated and the pneumatic tourniquet was inflated to 300 mmHg.  A formal timeout was conducted prior to incision.    Attention was then directed to the lateral aspect of the ankle.  A linear longitudinal incision was performed over the midline of the distal fibula.  Incision was performed in layers giving adequate visualization of the fracture site.  Vital structures were identified and preserved.  The fracture was evaluated which showed the primary spiral type fracture ending at the ankle joint.  There was mild comminution involving the distal aspect of the lateral malleolus consistent with a Avila type fracture.  The fracture hematoma was evacuated.  The fracture fragments were manipulated and maintained in reduction.  Definitive fixation was achieved utilizing a 3.5 mm cannulated screw from posterior to anterior orientation across the primary fracture.  A secondary cannulated screw was placed from anterior to posterior across the Wagstaffe fracture.  Imaging was performed showing adequate reduction.  The fixation was neutralized with a distal contoured locking plate.  The plate was secured to bone utilizing locking and nonlocking 3.5 millimeter screws of various lengths.  Imaging was performed showing excellent reduction of the lateral malleolus fracture.  Manipulation of the lateral malleolus has allowed for approximation of the posterior malleolar fragment.  No fixation is required.  Under live fluoroscopy, both  the syndesmosis and the deltoid ligament were stressed showing no deficit and no need for repair.  The wound was irrigated with copious amounts of normal saline.  Deep structures were closed with a 2-0 Vicryl in a simple interrupted manner.  The subcutaneous tissues were closed with 4-0 Vicryl and the skin was repaired with staples.  The incision was dressed with Xeroform and sterile compressive dressings.  The ankle was released and a prompt hyperemic response was noted to all digits of the right lower extremity.  The limb was placed into a well-padded posterior splint.  Patient tolerated the procedure and anesthesia well.  He was transferred from the operating room to the recovery room with vital signs stable and neuro vas test unchanged to the right lower extremity.      Dr. David Taylor, VEENA  [facility]  [phone]    Note is dictated utilizing voice recognition software. Unfortunately this leads to occasional typographical errors. I apologize in advance if the situation occurs. If questions occur please do not hesitate to call our office.

## 2022-04-08 NOTE — ANESTHESIA PROCEDURE NOTES
Airway  Urgency: elective    Date/Time: 4/8/2022 10:43 AM  End Time:4/8/2022 10:43 AM    General Information and Staff    Patient location during procedure: OR    Indications and Patient Condition  Indications for airway management: airway protection    Preoxygenated: yes  MILS maintained throughout  Mask difficulty assessment: 0 - not attempted    Final Airway Details  Final airway type: supraglottic airway      Successful airway: classic  Size 4    Number of attempts at approach: 1  Assessment: lips, teeth, and gum same as pre-op and atraumatic intubation

## 2022-04-08 NOTE — ANESTHESIA PREPROCEDURE EVALUATION
Anesthesia Evaluation     Patient summary reviewed   NPO Solid Status: > 8 hours  NPO Liquid Status: > 8 hours           Airway   Mallampati: II  TM distance: >3 FB  Neck ROM: full  No difficulty expected  Dental - normal exam     Pulmonary - normal exam   (+) asthma,  Cardiovascular - normal exam  Exercise tolerance: good (4-7 METS)        Neuro/Psych  GI/Hepatic/Renal/Endo    (+)  GERD,      Musculoskeletal     Abdominal  - normal exam    Bowel sounds: normal.   Substance History      OB/GYN          Other   arthritis,                      Anesthesia Plan    ASA 2     general with block     intravenous induction     Anesthetic plan, all risks, benefits, and alternatives have been provided, discussed and informed consent has been obtained with: patient.        CODE STATUS:

## 2022-04-08 NOTE — ANESTHESIA PROCEDURE NOTES
Peripheral Block      Patient reassessed immediately prior to procedure    Patient location during procedure: pre-op  Start time: 4/8/2022 10:10 AM  Stop time: 4/8/2022 10:25 AM  Reason for block: at surgeon's request and post-op pain management  Performed by  Anesthesiologist: Femi Holt MD  Assisted by: Natanael Church RN  Preanesthetic Checklist  Completed: patient identified, IV checked, site marked, risks and benefits discussed, surgical consent, monitors and equipment checked, pre-op evaluation and timeout performed  Prep:  Pt Position: supine  Sterile barriers:cap, gloves, mask and washed/disinfected hands  Prep: ChloraPrep  Patient monitoring: blood pressure monitoring, continuous pulse oximetry and EKG  Procedure    Sedation: yes  Performed under: local infiltration  Guidance:ultrasound guided    ULTRASOUND INTERPRETATION. Using ultrasound guidance a 20 G gauge needle was placed in close proximity to the nerve, at which point, under ultrasound guidance anesthetic was injected in the area of the nerve and spread of the anesthesia was seen on ultrasound in close proximity thereto.  There were no abnormalities seen on ultrasound; a digital image was taken; and the patient tolerated the procedure with no complications. Images:still images obtained, printed/placed on chart    Laterality:right  Block Type:adductor canal block and popliteal  Injection Technique:single-shot  Needle Type:echogenic  Needle Gauge:20 G  Resistance on Injection: none  Sedation medications used: midazolam (VERSED) injection, 2 mg  fentaNYL citrate (PF) (SUBLIMAZE) injection, 100 mcg  Medications Used: dexamethasone (DECADRON) injection, 8 mg  ropivacaine (NAROPIN) 0.5 % injection, 60 mL  Med administered at 4/8/2022 10:24 AM      Post Assessment  Injection Assessment: negative aspiration for heme, incremental injection and no paresthesia on injection  Patient Tolerance:comfortable throughout block  Complications:no

## 2022-04-11 LAB — QT INTERVAL: 346 MS

## 2022-04-25 ENCOUNTER — OFFICE VISIT (OUTPATIENT)
Dept: PODIATRY | Facility: CLINIC | Age: 42
End: 2022-04-25

## 2022-04-25 VITALS
DIASTOLIC BLOOD PRESSURE: 76 MMHG | HEART RATE: 109 BPM | WEIGHT: 252 LBS | HEIGHT: 70 IN | SYSTOLIC BLOOD PRESSURE: 111 MMHG | BODY MASS INDEX: 36.08 KG/M2

## 2022-04-25 DIAGNOSIS — S82.851D CLOSED DISPLACED TRIMALLEOLAR FRACTURE OF RIGHT ANKLE WITH ROUTINE HEALING, SUBSEQUENT ENCOUNTER: Primary | ICD-10-CM

## 2022-04-25 PROCEDURE — 99024 POSTOP FOLLOW-UP VISIT: CPT | Performed by: PODIATRIST

## 2022-04-25 NOTE — PROGRESS NOTES
"04/25/2022  Foot and Ankle Surgery - Established Patient/Follow-up  Provider: Dr. David Taylor, VEENA  Location: [facility]    Subjective:  Munir Cuadra is a 41 y.o. male.     Chief Complaint   Patient presents with   • Right Ankle - Pain   • Post-op     No pcp per patient       HPI: Patient returns 2 weeks after surgery.  He states that he is doing quite well.  He has remained nonweightbearing as discussed.  No other issues or concerns    Allergies   Allergen Reactions   • Tramadol Itching   • Erythromycin GI Intolerance       Current Outpatient Medications on File Prior to Visit   Medication Sig Dispense Refill   • budesonide-formoterol (SYMBICORT) 160-4.5 MCG/ACT inhaler Inhale 2 puffs.     • CBD (cannabidiol) oral oil Take  by mouth.     • Cetirizine HCl 10 MG capsule Take 10 mg by mouth Daily.     • Cholecalciferol 25 MCG (1000 UT) capsule Take 1,000 Units by mouth Daily.     • esomeprazole (NexIUM) 20 MG packet Take 20 mg by mouth Daily.     • fluticasone (FLONASE) 50 MCG/ACT nasal spray 2 sprays into the nostril(s) as directed by provider Daily. 16 g 0   • ibuprofen (ADVIL,MOTRIN) 800 MG tablet Take 800 mg by mouth Every 6 (Six) Hours As Needed for Mild Pain .     • KRILL OIL PO Take  by mouth.     • Magnesium 100 MG capsule Take  by mouth.     • multivitamin (THERAGRAN) tablet tablet Take 1 tablet by mouth Daily.     • vitamin C (ASCORBIC ACID) 250 MG tablet Take 250 mg by mouth Daily.     • Zinc 100 MG tablet Take  by mouth.     • HYDROcodone-acetaminophen (NORCO) 7.5-325 MG per tablet Take 1 tablet by mouth Every 6 (Six) Hours As Needed for Moderate Pain  (Pain). 28 tablet 0     No current facility-administered medications on file prior to visit.       Objective   /76   Pulse 109   Ht 177.8 cm (70\")   Wt 114 kg (252 lb)   BMI 36.16 kg/m²     General:   Appearance: appears stated age and healthy    Orientation: AAOx3    Affect: appropriate    Assistance: crutches    Shoe Gear:  CAM " boot     VASCULAR       Right Foot Vascularity   Normal vascular exam    Dorsalis pedis:  2+  Posterior tibial:  2+  Skin Temperature: warm    Edema Gradin+  CFT:  < 3 seconds  Pedal Hair Growth:  Present  Varicosities: none        NEUROLOGIC      Right Foot Neurologic   Light touch sensation:  Normal  Hot/Cold sensation: normal    Achilles reflex:  2+      MUSCULOSKELETAL       Right Foot Musculoskeletal   Ecchymosis:  Dorsal foot and ankle joint (Lower leg ecchymosis)  Tenderness: dorsal foot, lateral malleolus and anterior tibiotalar joint line    Arch:  Normal      DERMATOLOGIC      Right Foot Dermatologic   Skin: Incision site is dry and stable with intact staples.  No evidence of dehiscence or infection    Range of motion is relatively supple.  Mild swelling to the jenny malleoli region    Assessment/Plan   Diagnoses and all orders for this visit:    1. Closed displaced trimalleolar fracture of right ankle with routine healing, subsequent encounter (Primary)      Patient is doing relatively well.  Staples were removed today without complication.  Incision appears to be well-healed.  I have recommended that he start gentle range of motion exercises.  I have asked that he transition to a cam boot.  He may continue using the knee scooter for longer distances, but can start partial protected weightbearing in the cam boot.  He is to avoid high-impact and excessive activity and remain off work.  I would like to see him in 2 weeks for reevaluation and imaging.    No orders of the defined types were placed in this encounter.         Note is dictated utilizing voice recognition software. Unfortunately this leads to occasional typographical errors. I apologize in advance if the situation occurs. If questions occur please do not hesitate to call our office.

## 2022-04-26 ENCOUNTER — TELEPHONE (OUTPATIENT)
Dept: PODIATRY | Facility: CLINIC | Age: 42
End: 2022-04-26

## 2022-04-26 NOTE — TELEPHONE ENCOUNTER
Caller: ZANKORY    Relationship: [employer]     Best call back number: [phone]    What form or medical record are you requesting: OFFICE NOTES 4-25-22    Who is requesting this form or medical record from you: DANIELA    How would you like to receive the form or medical records (pick-up, mail, fax): FAX  If fax, what is the fax number: [phone]    Timeframe paperwork needed: SOON

## 2022-05-09 ENCOUNTER — OFFICE VISIT (OUTPATIENT)
Dept: PODIATRY | Facility: CLINIC | Age: 42
End: 2022-05-09

## 2022-05-09 VITALS
SYSTOLIC BLOOD PRESSURE: 135 MMHG | DIASTOLIC BLOOD PRESSURE: 95 MMHG | HEART RATE: 84 BPM | HEIGHT: 70 IN | WEIGHT: 252 LBS | BODY MASS INDEX: 36.08 KG/M2

## 2022-05-09 DIAGNOSIS — S82.851D CLOSED DISPLACED TRIMALLEOLAR FRACTURE OF RIGHT ANKLE WITH ROUTINE HEALING, SUBSEQUENT ENCOUNTER: Primary | ICD-10-CM

## 2022-05-09 PROCEDURE — 99024 POSTOP FOLLOW-UP VISIT: CPT | Performed by: PODIATRIST

## 2022-05-09 NOTE — PROGRESS NOTES
05/09/2022  Foot and Ankle Surgery - Established Patient/Follow-up  Provider: Dr. David Taylor DPM  Location: [facility]    Subjective:  Munir Cuadra is a 41 y.o. male.     Chief Complaint   Patient presents with   • Right Ankle - Pain   • Follow-up     NO PCP        HPI:    The patient presented to the clinic returns for follow-up on his right ankle. He reports that he got an injurie on 03/29/2022 and had the surgery on 04/08/2022  now he is 4 weeks postoperative .He states that he is doing well .He is still uses the boot for outdoor activities however at home he walk without them. He notes mild swelling at the ankle .    Allergies   Allergen Reactions   • Tramadol Itching   • Erythromycin GI Intolerance       Current Outpatient Medications on File Prior to Visit   Medication Sig Dispense Refill   • budesonide-formoterol (SYMBICORT) 160-4.5 MCG/ACT inhaler Inhale 2 puffs.     • CBD (cannabidiol) oral oil Take  by mouth.     • Cetirizine HCl 10 MG capsule Take 10 mg by mouth Daily.     • Cholecalciferol 25 MCG (1000 UT) capsule Take 1,000 Units by mouth Daily.     • esomeprazole (NexIUM) 20 MG packet Take 20 mg by mouth Daily.     • fluticasone (FLONASE) 50 MCG/ACT nasal spray 2 sprays into the nostril(s) as directed by provider Daily. 16 g 0   • ibuprofen (ADVIL,MOTRIN) 800 MG tablet Take 800 mg by mouth Every 6 (Six) Hours As Needed for Mild Pain .     • KRILL OIL PO Take  by mouth.     • Magnesium 100 MG capsule Take  by mouth.     • multivitamin (THERAGRAN) tablet tablet Take 1 tablet by mouth Daily.     • vitamin C (ASCORBIC ACID) 250 MG tablet Take 250 mg by mouth Daily.     • Zinc 100 MG tablet Take  by mouth.       No current facility-administered medications on file prior to visit.     General:   Appearance: appears stated age and healthy    Orientation: AAOx3    Affect: appropriate    Assistance: crutches    Shoe Gear:  CAM boot     VASCULAR       Right Foot Vascularity   Normal vascular  "exam    Dorsalis pedis:  2+  Posterior tibial:  2+  Skin Temperature: warm    Edema Gradin+  CFT:  < 3 seconds  Pedal Hair Growth:  Present  Varicosities: none        NEUROLOGIC      Right Foot Neurologic   Light touch sensation:  Normal  Hot/Cold sensation: normal    Achilles reflex:  2+      MUSCULOSKELETAL       Right Foot Musculoskeletal   Ecchymosis:  Dorsal foot and ankle joint (Lower leg ecchymosis)  Tenderness: dorsal foot, lateral malleolus and anterior tibiotalar joint line    Arch:  Normal      DERMATOLOGIC      Right Foot Dermatologic   Skin:  Incision site is well-healed.    Mild swelling to the perimalleoli region.  Range of motion is improving.    Objective   /95   Pulse 84   Ht 177.8 cm (70\")   Wt 114 kg (252 lb)   BMI 36.16 kg/m²           Diagnoses and all orders for this visit:    1. Closed displaced trimalleolar fracture of right ankle with routine healing, subsequent encounter (Primary)  -     XR Ankle 3+ View Right      Patient has been advised to stay in the boot for the next 2 weeks with any kind of out of the house activities.  Keep up with the range of motion and start some stretching and strengthening exercise along with it.  Avoid high-impact and excessive activity  Follow up in 4 weeks, repeat X-rays and off of work until we reassess.    Orders Placed This Encounter   Procedures   • XR Ankle 3+ View Right     Order Specific Question:   Reason for Exam:     Answer:   closed displaced trimalleolar fx right ankle room 14 wb     Order Specific Question:   Does this patient have a diabetic monitoring/medication delivering device on?     Answer:   No     Order Specific Question:   Release to patient     Answer:   Immediate          Note is dictated utilizing voice recognition software. Unfortunately this leads to occasional typographical errors. I apologize in advance if the situation occurs. If questions occur please do not hesitate to call our office.    Transcribed from " ambient dictation for DREA Taylor DPM by Jean Henson.  05/09/22   11:06 EDT    Patient verbalized consent to the visit recording.

## 2022-05-10 ENCOUNTER — TELEPHONE (OUTPATIENT)
Dept: PODIATRY | Facility: CLINIC | Age: 42
End: 2022-05-10

## 2022-05-10 NOTE — TELEPHONE ENCOUNTER
Call from [employer], requesting office visit note from 5/9/2022 and confirming next office visit on 6/9/2022. Office note faxed to [phone] with claim # [id number] on cover sheet.

## 2022-06-09 ENCOUNTER — OFFICE VISIT (OUTPATIENT)
Dept: PODIATRY | Facility: CLINIC | Age: 42
End: 2022-06-09

## 2022-06-09 VITALS
HEART RATE: 89 BPM | DIASTOLIC BLOOD PRESSURE: 90 MMHG | WEIGHT: 252 LBS | HEIGHT: 70 IN | BODY MASS INDEX: 36.08 KG/M2 | SYSTOLIC BLOOD PRESSURE: 132 MMHG

## 2022-06-09 DIAGNOSIS — S82.851D CLOSED DISPLACED TRIMALLEOLAR FRACTURE OF RIGHT ANKLE WITH ROUTINE HEALING, SUBSEQUENT ENCOUNTER: Primary | ICD-10-CM

## 2022-06-09 PROCEDURE — 99024 POSTOP FOLLOW-UP VISIT: CPT | Performed by: PODIATRIST

## 2022-06-09 RX ORDER — DILTIAZEM HYDROCHLORIDE 60 MG/1
TABLET, FILM COATED ORAL
COMMUNITY
Start: 2022-05-17

## 2022-06-09 RX ORDER — ALBUTEROL SULFATE 90 UG/1
AEROSOL, METERED RESPIRATORY (INHALATION)
COMMUNITY
Start: 2022-05-17

## 2022-06-09 NOTE — PROGRESS NOTES
06/09/2022  Foot and Ankle Surgery - Established Patient/Follow-up  Provider: Dr. David Taylor DPM  Location: [facility]    Subjective:  Munir Cuadra is a 41 y.o. male.     Chief Complaint   Patient presents with   • Right Ankle - Pain   • Follow-up     Last pcp appt with [facility] 05/09/2022  (Patient cannot remember name of )        HPI:    Munir Cuadra 41 years old male who returns to clinic for follow up regarding his right ankle .  The patient reports that he is doing well .He notes having mild swelling at the ankle. He has not walked without the boot yet and he has not returned to work. He would like to know if he can go back to normal activities and driving .    Allergies   Allergen Reactions   • Tramadol Itching   • Erythromycin GI Intolerance       Current Outpatient Medications on File Prior to Visit   Medication Sig Dispense Refill   • albuterol sulfate  (90 Base) MCG/ACT inhaler      • budesonide-formoterol (SYMBICORT) 160-4.5 MCG/ACT inhaler Inhale 2 puffs.     • CBD (cannabidiol) oral oil Take  by mouth.     • Cetirizine HCl 10 MG capsule Take 10 mg by mouth Daily.     • Cholecalciferol 25 MCG (1000 UT) capsule Take 1,000 Units by mouth Daily.     • esomeprazole (NexIUM) 20 MG packet Take 20 mg by mouth Daily.     • fluticasone (FLONASE) 50 MCG/ACT nasal spray 2 sprays into the nostril(s) as directed by provider Daily. 16 g 0   • ibuprofen (ADVIL,MOTRIN) 800 MG tablet Take 800 mg by mouth Every 6 (Six) Hours As Needed for Mild Pain .     • KRILL OIL PO Take  by mouth.     • Magnesium 100 MG capsule Take  by mouth.     • multivitamin (THERAGRAN) tablet tablet Take 1 tablet by mouth Daily.     • Symbicort 80-4.5 MCG/ACT inhaler      • vitamin C (ASCORBIC ACID) 250 MG tablet Take 250 mg by mouth Daily.     • Zinc 100 MG tablet Take  by mouth.       No current facility-administered medications on file prior to visit.       Objective   /90   Pulse 89   Ht 177.8 cm  "(70\")   Wt 114 kg (252 lb)   BMI 36.16 kg/m²     Foot/Ankle Exam:       General:   Appearance: appears stated age and healthy    Orientation: AAOx3    Affect: appropriate    Assistance: crutches    Shoe Gear:  CAM boot    VASCULAR      Right Foot Vascularity   Normal vascular exam    Dorsalis pedis:  2+  Posterior tibial:  2+  Skin Temperature: warm    Edema Gradin+  CFT:  < 3 seconds  Pedal Hair Growth:  Present  Varicosities: none        NEUROLOGIC     Right Foot Neurologic   Light touch sensation:  Normal  Hot/Cold sensation: normal    Achilles reflex:  2+     MUSCULOSKELETAL      Right Foot Musculoskeletal   Ecchymosis:  Dorsal foot and ankle joint (Lower leg ecchymosis)  Tenderness: dorsal foot, lateral malleolus and anterior tibiotalar joint line    Arch:  Normal     DERMATOLOGIC     Right Foot Dermatologic   Skin: skin intact        Right Foot Additional Comments Muscle strength, range of motion, and instability testing deferred secondary to guarding.  No resting deformity.  Swelling and inflammation to the ankle region.  No proximal fibular pain.    2022 Range of motion is substantially improved.   Mild swelling involving the right lower extremity.          Assessment & Plan   Diagnoses and all orders for this visit:    1. Closed displaced trimalleolar fracture of right ankle with routine healing, subsequent encounter (Primary)  -     XR Ankle 3+ View Right    We discussed the recovery process .He has been advised to start walking, driving without the boot ,keep regular shoe around the house and wear compression stockings for the swelling. I will give him brace for regular day activities. He can go back to work in 2 weeks.  I recommended him to continue on rest, ice, compress, elevate and anti-inflammatory when needed.    Follow up back in 4 weeks.      Orders Placed This Encounter   Procedures   • XR Ankle 3+ View Right     Order Specific Question:   Reason for Exam:     Answer:   POST OP " 04/08/2022 Ankle Open Reduction Internal Fixation - Right Room 13 wb     Order Specific Question:   Does this patient have a diabetic monitoring/medication delivering device on?     Answer:   No     Order Specific Question:   Release to patient     Answer:   Immediate          Note is dictated utilizing voice recognition software. Unfortunately this leads to occasional typographical errors. I apologize in advance if the situation occurs. If questions occur please do not hesitate to call our office.    Transcribed from ambient dictation for T David Taylor DPM by Jean Henson.  06/09/22   11:58 EDT    Patient verbalized consent to the visit recording.

## 2022-06-10 ENCOUNTER — TELEPHONE (OUTPATIENT)
Dept: PODIATRY | Facility: CLINIC | Age: 42
End: 2022-06-10

## 2022-06-10 NOTE — TELEPHONE ENCOUNTER
Caller: MAYRA    Relationship:     Best call back number:[phone]    What form or medical record are you requesting: RETURN TO WORK NOTE WITH ANY RESTRICTION/NO RESTRICTION.    LAST OFFICE NOTES.   ANY ORDERS    Who is requesting this form or medical record from you: W/C    How would you like to receive the form or medical records (pick-up, mail, fax):   If fax, what is the fax number: [phone]    Additional notes: CLAIM NUMBER MUST BE ON ANY FAXED PAPER WORK #[id number]

## 2022-07-07 ENCOUNTER — OFFICE VISIT (OUTPATIENT)
Dept: PODIATRY | Facility: CLINIC | Age: 42
End: 2022-07-07

## 2022-07-07 VITALS
WEIGHT: 252 LBS | HEART RATE: 97 BPM | SYSTOLIC BLOOD PRESSURE: 138 MMHG | HEIGHT: 70 IN | BODY MASS INDEX: 36.08 KG/M2 | DIASTOLIC BLOOD PRESSURE: 65 MMHG

## 2022-07-07 DIAGNOSIS — S82.851D CLOSED DISPLACED TRIMALLEOLAR FRACTURE OF RIGHT ANKLE WITH ROUTINE HEALING, SUBSEQUENT ENCOUNTER: ICD-10-CM

## 2022-07-07 DIAGNOSIS — M25.571 ACUTE RIGHT ANKLE PAIN: Primary | ICD-10-CM

## 2022-07-07 PROCEDURE — 99024 POSTOP FOLLOW-UP VISIT: CPT | Performed by: PODIATRIST

## 2022-07-07 NOTE — PROGRESS NOTES
"07/07/2022  Foot and Ankle Surgery - Established Patient/Follow-up  Provider: Dr. David Taylor DPM  Location: [facility]    Subjective:  Munir Cuadra is a 41 y.o. male.     Chief Complaint   Patient presents with   • Right Ankle - Pain, Post-op     PCP name unknown 5/15/2022       HPI:     The patient is a 41-year-old male who returns for follow-up regarding his right ankle.     He reports that he has returned to work. He notes experiencing mild pain in his ankle for a few days when he returned to work; however, his ankle did not give away while ambulating.     Allergies   Allergen Reactions   • Tramadol Itching   • Erythromycin GI Intolerance       Current Outpatient Medications on File Prior to Visit   Medication Sig Dispense Refill   • albuterol sulfate  (90 Base) MCG/ACT inhaler      • budesonide-formoterol (SYMBICORT) 160-4.5 MCG/ACT inhaler Inhale 2 puffs.     • CBD (cannabidiol) oral oil Take  by mouth.     • Cetirizine HCl 10 MG capsule Take 10 mg by mouth Daily.     • Cholecalciferol 25 MCG (1000 UT) capsule Take 1,000 Units by mouth Daily.     • esomeprazole (NexIUM) 20 MG packet Take 20 mg by mouth Daily.     • fluticasone (FLONASE) 50 MCG/ACT nasal spray 2 sprays into the nostril(s) as directed by provider Daily. 16 g 0   • ibuprofen (ADVIL,MOTRIN) 800 MG tablet Take 800 mg by mouth Every 6 (Six) Hours As Needed for Mild Pain .     • KRILL OIL PO Take  by mouth.     • Magnesium 100 MG capsule Take  by mouth.     • multivitamin (THERAGRAN) tablet tablet Take 1 tablet by mouth Daily.     • Symbicort 80-4.5 MCG/ACT inhaler      • vitamin C (ASCORBIC ACID) 250 MG tablet Take 250 mg by mouth Daily.     • Zinc 100 MG tablet Take  by mouth.       No current facility-administered medications on file prior to visit.       Objective   /65   Pulse 97   Ht 177.8 cm (70\")   Wt 114 kg (252 lb)   BMI 36.16 kg/m²     Foot/Ankle Exam:       General:   Appearance: appears stated age and " healthy    Orientation: AAOx3    Affect: appropriate    Assistance: crutches    Shoe Gear:  CAM boot    VASCULAR      Right Foot Vascularity   Normal vascular exam    Dorsalis pedis:  2+  Posterior tibial:  2+  Skin Temperature: warm    Edema Gradin+  CFT:  < 3 seconds  Pedal Hair Growth:  Present  Varicosities: none        NEUROLOGIC     Right Foot Neurologic   Light touch sensation:  Normal  Hot/Cold sensation: normal    Achilles reflex:  2+     MUSCULOSKELETAL      Right Foot Musculoskeletal   Ecchymosis:  Dorsal foot and ankle joint (Lower leg ecchymosis)  Tenderness: dorsal foot, lateral malleolus and anterior tibiotalar joint line    Arch:  Normal     DERMATOLOGIC     Right Foot Dermatologic   Skin: skin intact        Right Foot Additional Comments Muscle strength, range of motion, and instability testing deferred secondary to guarding.  No resting deformity.  Swelling and inflammation to the ankle region.  No proximal fibular pain.    2022 Range of motion is substantially improved.   Mild swelling involving the right lower extremity.      Assessment & Plan   Diagnoses and all orders for this visit:    1. Acute right ankle pain (Primary)    2. Closed displaced trimalleolar fracture of right ankle with routine healing, subsequent encounter        Patient returns for follow-up regarding his right ankle. There is baseline swelling to his lower extremity. I recommended that he try continue to wear a compression stocking or ankle sleeve daily. He will follow up on an as needed basis    No orders of the defined types were placed in this encounter.         Note is dictated utilizing voice recognition software. Unfortunately this leads to occasional typographical errors. I apologize in advance if the situation occurs. If questions occur please do not hesitate to call our office.    Transcribed from ambient dictation for DREA Taylor DPM by Miranda Mckeon.  22   10:40 EDT    Patient verbalized  consent to the visit recording.  I have personally performed the services described in this document as transcribed by the above individual, and it is both accurate and complete.  DREA Taylor DPM  7/8/2022  07:33 EDT

## 2022-07-15 ENCOUNTER — TELEPHONE (OUTPATIENT)
Dept: ORTHOPEDIC SURGERY | Facility: CLINIC | Age: 42
End: 2022-07-15

## 2022-07-15 NOTE — TELEPHONE ENCOUNTER
[employer] (Patients W/C) called requesting 7/7/2022 OV notes. Note was faxed to the number provided.

## 2022-12-20 ENCOUNTER — HOSPITAL ENCOUNTER (OUTPATIENT)
Facility: HOSPITAL | Age: 42
Discharge: HOME | End: 2022-12-20
Attending: EMERGENCY MEDICINE

## 2022-12-20 VITALS
SYSTOLIC BLOOD PRESSURE: 139 MMHG | TEMPERATURE: 97.9 F | WEIGHT: 245 LBS | RESPIRATION RATE: 18 BRPM | DIASTOLIC BLOOD PRESSURE: 94 MMHG | OXYGEN SATURATION: 99 % | HEART RATE: 89 BPM | HEIGHT: 71 IN | BODY MASS INDEX: 34.3 KG/M2

## 2022-12-20 DIAGNOSIS — B97.89 VIRAL RESPIRATORY INFECTION: Primary | ICD-10-CM

## 2022-12-20 DIAGNOSIS — J98.8 VIRAL RESPIRATORY INFECTION: Primary | ICD-10-CM

## 2022-12-20 LAB
FLUAV SUBTYP SPEC NAA+PROBE: NOT DETECTED
FLUBV RNA ISLT QL NAA+PROBE: NOT DETECTED
SARS-COV-2 RNA RESP QL NAA+PROBE: NOT DETECTED
STREP A PCR: NOT DETECTED

## 2022-12-20 PROCEDURE — G0463 HOSPITAL OUTPT CLINIC VISIT: HCPCS | Performed by: EMERGENCY MEDICINE

## 2022-12-20 PROCEDURE — 99214 OFFICE O/P EST MOD 30 MIN: CPT | Performed by: EMERGENCY MEDICINE

## 2022-12-20 PROCEDURE — 87636 SARSCOV2 & INF A&B AMP PRB: CPT | Performed by: EMERGENCY MEDICINE

## 2022-12-20 PROCEDURE — 87651 STREP A DNA AMP PROBE: CPT | Performed by: EMERGENCY MEDICINE

## 2022-12-20 PROCEDURE — EDLOS: Performed by: EMERGENCY MEDICINE

## 2022-12-20 NOTE — DISCHARGE INSTRUCTIONS
Please read the attached discharge instructions.  Come back to the emergency department for any new or concerning symptoms.    The medication list on this paperwork may not be correct. Please do not make any changes that we have not personally discussed.

## 2022-12-20 NOTE — FSED PROVIDER NOTE
[facility] FREE-STANDING ED / URGENT CARE    Patient: Munir Cuadra 1980 [id number]  Physician: Francheska Manley MD  DOS: 12/20/2022    [facility]  History of Present Illness  42-year-old male with a history of asthma presents with 2 days of resolving sore throat, and initially dry cough, now productive of greenish sputum and nasal congestion.  He has also had rhinorrhea.  Denies fever.  He has been eating and drinking well.  He has not needed to use his inhaler more than usual.  He has been taking Mucinex combination cough medication at home.        ROS  As reviewed in HPI above.    Prior to Admission medications    Medication Sig Start Date End Date Taking? Authorizing Provider   albuterol sulfate  (90 Base) MCG/ACT inhaler  5/17/22   ProviderSamantha MD   budesonide-formoterol (SYMBICORT) 160-4.5 MCG/ACT inhaler Inhale 2 puffs.    ProviderSamantha MD   CBD (cannabidiol) oral oil Take  by mouth.    ProviderSamantha MD   Cetirizine HCl 10 MG capsule Take 10 mg by mouth Daily.    Emergency, Nurse ABDOUL Rowan   Cholecalciferol 25 MCG (1000 UT) capsule Take 1,000 Units by mouth Daily.    Emergency, Nurse Epic, RN   esomeprazole (NexIUM) 20 MG packet Take 20 mg by mouth Daily.    Emergency, Nurse Epic, RN   fluticasone (FLONASE) 50 MCG/ACT nasal spray 2 sprays into the nostril(s) as directed by provider Daily. 5/1/21   Munira Yap MD   ibuprofen (ADVIL,MOTRIN) 800 MG tablet Take 800 mg by mouth Every 6 (Six) Hours As Needed for Mild Pain .    ProviderSamantha MD   KRILL OIL PO Take  by mouth.    Emergency, Nurse ABDOUL Rowan   Magnesium 100 MG capsule Take  by mouth.    Samantha Gallegos MD   multivitamin (THERAGRAN) tablet tablet Take 1 tablet by mouth Daily.    Emergency, Nurse ABDOUL Rowan   Symbicort 80-4.5 MCG/ACT inhaler  5/17/22   Samantha Gallegos MD   vitamin C (ASCORBIC ACID) 250 MG tablet Take 250 mg by mouth Daily.    Samantha Gallegos MD   Zinc 100 MG tablet Take  by  mouth.    Provider, Samantha, MD     Past Medical History:   Diagnosis Date   • Arthritis    • Asthma    • GERD (gastroesophageal reflux disease)      Past Surgical History:   Procedure Laterality Date   • ANKLE OPEN REDUCTION INTERNAL FIXATION Right 4/8/2022    Procedure: ANKLE OPEN REDUCTION INTERNAL FIXATION;  Surgeon: DREA Taylor DPM;  Location: [facility] OR;  Service: Podiatry;  Laterality: Right;   • LIPOMA EXCISION      chest and legs arms     Family History   Problem Relation Age of Onset   • No Known Problems Mother    • No Known Problems Father      Social History     Socioeconomic History   • Marital status:    Tobacco Use   • Smoking status: Never   • Smokeless tobacco: Never   • Tobacco comments:     nopassive exp   Vaping Use   • Vaping Use: Never used   Substance and Sexual Activity   • Alcohol use: Never   • Drug use: Never   • Sexual activity: Defer     Allergies   Allergen Reactions   • Tramadol Itching   • Erythromycin GI Intolerance       PHYSICAL EXAM  Vital Signs (last day)     Date/Time Temp Temp src Pulse Resp BP Patient Position SpO2    12/20/22 0903 97.9 (36.6) Oral 89 18 139/94 Sitting 99        Physical Exam  Vitals and nursing note reviewed.   Constitutional:       General: He is not in acute distress.     Appearance: He is well-developed.   HENT:      Head: Normocephalic and atraumatic.   Eyes:      Conjunctiva/sclera: Conjunctivae normal.   Cardiovascular:      Rate and Rhythm: Normal rate and regular rhythm.      Heart sounds: No murmur heard.  Pulmonary:      Effort: Pulmonary effort is normal.      Breath sounds: Normal breath sounds. No wheezing.   Skin:     General: Skin is warm and dry.   Neurological:      Mental Status: He is alert.   Psychiatric:         Mood and Affect: Mood normal.         Behavior: Behavior normal.           DIAGNOSTICS  No radiology results for the last 7 days    Labs Reviewed   RAPID STREP A SCREEN - Normal   COVID-19 AND FLU A/B, NP SWAB  IN TRANSPORT MEDIA 8-12 HR TAT - Normal    Narrative:     Fact sheet for providers: https://www.fda.gov/media/298452/download    Fact sheet for patients: https://www.fda.gov/media/910913/download    Test performed by PCR.         MDM  Number of Diagnoses or Management Options  Viral respiratory infection  Diagnosis management comments: History and physical exam consistent with viral respiratory infection.  Viral panel negative.  Discharged home with anticipatory guidance and symptomatic management.             No orders of the defined types were placed in this encounter.      Procedures    Final diagnoses:   Viral respiratory infection       Your primary care physician    Call in 3 days  If symptoms worsen      ED Disposition     ED Disposition   Discharge    Condition   Stable    Comment   --             Francheska Manley MD

## 2023-04-11 ENCOUNTER — HOSPITAL ENCOUNTER (EMERGENCY)
Facility: HOSPITAL | Age: 43
Discharge: HOME | End: 2023-04-11
Attending: EMERGENCY MEDICINE | Admitting: EMERGENCY MEDICINE
Payer: COMMERCIAL

## 2023-04-11 VITALS
RESPIRATION RATE: 18 BRPM | BODY MASS INDEX: 36.08 KG/M2 | WEIGHT: 252 LBS | HEIGHT: 70 IN | OXYGEN SATURATION: 96 % | SYSTOLIC BLOOD PRESSURE: 119 MMHG | HEART RATE: 95 BPM | DIASTOLIC BLOOD PRESSURE: 78 MMHG | TEMPERATURE: 98.4 F

## 2023-04-11 DIAGNOSIS — R11.2 NAUSEA AND VOMITING, UNSPECIFIED VOMITING TYPE: ICD-10-CM

## 2023-04-11 DIAGNOSIS — R19.7 DIARRHEA, UNSPECIFIED TYPE: ICD-10-CM

## 2023-04-11 DIAGNOSIS — K52.9 GASTROENTERITIS: Primary | ICD-10-CM

## 2023-04-11 LAB
ALBUMIN SERPL-MCNC: 4 G/DL (ref 3.5–5.2)
ALBUMIN/GLOB SERPL: 1.5 G/DL
ALP SERPL-CCNC: 75 U/L (ref 39–117)
ALT SERPL W P-5'-P-CCNC: 26 U/L (ref 1–41)
ANION GAP SERPL CALCULATED.3IONS-SCNC: 9 MMOL/L (ref 5–15)
AST SERPL-CCNC: 20 U/L (ref 1–40)
BASOPHILS # BLD AUTO: 0.02 10*3/MM3 (ref 0–0.2)
BASOPHILS NFR BLD AUTO: 0.3 % (ref 0–1.5)
BILIRUB SERPL-MCNC: 0.6 MG/DL (ref 0–1.2)
BUN SERPL-MCNC: 21 MG/DL (ref 6–20)
BUN/CREAT SERPL: 29.2 (ref 7–25)
CALCIUM SPEC-SCNC: 8.1 MG/DL (ref 8.6–10.5)
CHLORIDE SERPL-SCNC: 100 MMOL/L (ref 98–107)
CO2 SERPL-SCNC: 27 MMOL/L (ref 22–29)
CREAT SERPL-MCNC: 0.72 MG/DL (ref 0.76–1.27)
DEPRECATED RDW RBC AUTO: 43.3 FL (ref 37–54)
EGFRCR SERPLBLD CKD-EPI 2021: 116.3 ML/MIN/1.73
EOSINOPHIL # BLD AUTO: 0.01 10*3/MM3 (ref 0–0.4)
EOSINOPHIL NFR BLD AUTO: 0.1 % (ref 0.3–6.2)
ERYTHROCYTE [DISTWIDTH] IN BLOOD BY AUTOMATED COUNT: 13.3 % (ref 12.3–15.4)
GLOBULIN UR ELPH-MCNC: 2.6 GM/DL
GLUCOSE SERPL-MCNC: 157 MG/DL (ref 65–99)
HCT VFR BLD AUTO: 47.4 % (ref 37.5–51)
HGB BLD-MCNC: 15.6 G/DL (ref 13–17.7)
IMM GRANULOCYTES # BLD AUTO: 0.02 10*3/MM3 (ref 0–0.05)
IMM GRANULOCYTES NFR BLD AUTO: 0.3 % (ref 0–0.5)
LIPASE SERPL-CCNC: 22 U/L (ref 13–60)
LYMPHOCYTES # BLD AUTO: 0.4 10*3/MM3 (ref 0.7–3.1)
LYMPHOCYTES NFR BLD AUTO: 5 % (ref 19.6–45.3)
MCH RBC QN AUTO: 28.9 PG (ref 26.6–33)
MCHC RBC AUTO-ENTMCNC: 32.9 G/DL (ref 31.5–35.7)
MCV RBC AUTO: 87.9 FL (ref 79–97)
MONOCYTES # BLD AUTO: 0.37 10*3/MM3 (ref 0.1–0.9)
MONOCYTES NFR BLD AUTO: 4.6 % (ref 5–12)
NEUTROPHILS NFR BLD AUTO: 7.18 10*3/MM3 (ref 1.7–7)
NEUTROPHILS NFR BLD AUTO: 89.7 % (ref 42.7–76)
PLATELET # BLD AUTO: 191 10*3/MM3 (ref 140–450)
PMV BLD AUTO: 11 FL (ref 6–12)
POTASSIUM SERPL-SCNC: 3.5 MMOL/L (ref 3.5–5.2)
PROT SERPL-MCNC: 6.6 G/DL (ref 6–8.5)
RBC # BLD AUTO: 5.39 10*6/MM3 (ref 4.14–5.8)
SODIUM SERPL-SCNC: 136 MMOL/L (ref 136–145)
WBC NRBC COR # BLD: 8 10*3/MM3 (ref 3.4–10.8)

## 2023-04-11 PROCEDURE — 96374 THER/PROPH/DIAG INJ IV PUSH: CPT

## 2023-04-11 PROCEDURE — 80053 COMPREHEN METABOLIC PANEL: CPT | Performed by: NURSE PRACTITIONER

## 2023-04-11 PROCEDURE — 85025 COMPLETE CBC W/AUTO DIFF WBC: CPT | Performed by: NURSE PRACTITIONER

## 2023-04-11 PROCEDURE — 96361 HYDRATE IV INFUSION ADD-ON: CPT

## 2023-04-11 PROCEDURE — 25010000002 ONDANSETRON PER 1 MG: Performed by: NURSE PRACTITIONER

## 2023-04-11 PROCEDURE — 99283 EMERGENCY DEPT VISIT LOW MDM: CPT

## 2023-04-11 PROCEDURE — 83690 ASSAY OF LIPASE: CPT | Performed by: NURSE PRACTITIONER

## 2023-04-11 RX ORDER — SODIUM CHLORIDE 0.9 % (FLUSH) 0.9 %
10 SYRINGE (ML) INJECTION AS NEEDED
Status: DISCONTINUED | OUTPATIENT
Start: 2023-04-11 | End: 2023-04-11 | Stop reason: HOSPADM

## 2023-04-11 RX ORDER — ONDANSETRON 4 MG/1
4 TABLET, ORALLY DISINTEGRATING ORAL EVERY 8 HOURS PRN
Qty: 15 TABLET | Refills: 0 | Status: SHIPPED | OUTPATIENT
Start: 2023-04-11

## 2023-04-11 RX ORDER — ONDANSETRON 2 MG/ML
4 INJECTION INTRAMUSCULAR; INTRAVENOUS ONCE
Status: COMPLETED | OUTPATIENT
Start: 2023-04-11 | End: 2023-04-11

## 2023-04-11 RX ADMIN — SODIUM CHLORIDE 1000 ML: 9 INJECTION, SOLUTION INTRAVENOUS at 12:35

## 2023-04-11 RX ADMIN — ONDANSETRON 4 MG: 2 INJECTION INTRAMUSCULAR; INTRAVENOUS at 12:35

## 2023-04-11 NOTE — Clinical Note
[facility] FSED [place] [id number] [place] [address] [place] [address]  Phone: [phone]    Munir Cuadra was seen and treated in our emergency department on 4/11/2023.  He may return to work on 04/13/2023.         Thank you for choosing [facility].    Sol Francisco APRN

## 2023-04-11 NOTE — DISCHARGE INSTRUCTIONS
Thank you letting us care for you today.  You have been given a prescription for for nausea and vomiting called Zofran.  Please take following package instructions for any further nausea or vomiting.  Please eat bland foods until your symptoms have resolved.  You may take Pepto-Bismol or Imodium follow package instructions should your diarrhea return.  In medicine there is always a level of diagnostic uncertainty. Even if it is low. For this reason, immediately return to the emergency department if you have any new symptoms, worsening symptoms, change of symptoms, or if you have any other concerns. We would be happy to re-evaluate you. Otherwise, please take your medications as prescribed and follow-up as recommended. This paperwork can be taken to your primary care provider to further discuss any non-emergent lab and/or imaging findings.

## 2023-04-11 NOTE — FSED PROVIDER NOTE
EMERGENCY DEPARTMENT ENCOUNTER      Room Number: 10/10    History is provided by the patient, no translation services needed    HPI:      Narrative: Pt is a 42 y.o. male who presents complaining of 2 days of nausea, vomiting, diarrhea, abdominal cramping.  Patient states onset was sudden.  He has had too numerous to count episodes of watery diarrhea.  He states he has not vomited since yesterday however he still does have nausea.  He states he does have a child at home with the same symptoms however the child has recovered well.  Patient states he has taken Pepto-Bismol with no relief in his nausea or diarrhea.  He is alert, oriented, and in no acute distress.  He cannot identify any modifying factors that make his symptoms better or worse.  Abdominal discomfort is reported to be crampy in sensation.  There is no blood in his stool per his report.      PMD: Carlos Villarreal MD    REVIEW OF SYSTEMS  Review of Systems   Constitutional: Negative for activity change, appetite change, chills, diaphoresis, fatigue, fever and unexpected weight change.   HENT: Negative for congestion, facial swelling, postnasal drip, rhinorrhea, sinus pressure, sinus pain, sneezing and sore throat.    Eyes: Negative for itching and visual disturbance.   Respiratory: Negative for cough, chest tightness and shortness of breath.    Cardiovascular: Negative for chest pain, palpitations and leg swelling.   Gastrointestinal: Positive for abdominal pain, diarrhea, nausea and vomiting.   Genitourinary: Negative.    Musculoskeletal: Negative for arthralgias and myalgias.   Skin: Negative for pallor and rash.   Allergic/Immunologic: Negative for immunocompromised state.   Neurological: Negative for dizziness, weakness, light-headedness and headaches.   Hematological: Negative.    Psychiatric/Behavioral: Negative.          PAST MEDICAL HISTORY  Active Ambulatory Problems     Diagnosis Date Noted   • Closed displaced trimalleolar fracture of  right lower leg 04/07/2022     Resolved Ambulatory Problems     Diagnosis Date Noted   • No Resolved Ambulatory Problems     Past Medical History:   Diagnosis Date   • Arthritis    • Asthma    • GERD (gastroesophageal reflux disease)        PAST SURGICAL HISTORY  Past Surgical History:   Procedure Laterality Date   • ANKLE OPEN REDUCTION INTERNAL FIXATION Right 4/8/2022    Procedure: ANKLE OPEN REDUCTION INTERNAL FIXATION;  Surgeon: DREA Taylor DPM;  Location: [facility] MAIN OR;  Service: Podiatry;  Laterality: Right;   • LIPOMA EXCISION      chest and legs arms       FAMILY HISTORY  Family History   Problem Relation Age of Onset   • No Known Problems Mother    • No Known Problems Father        SOCIAL HISTORY  Social History     Socioeconomic History   • Marital status:    Tobacco Use   • Smoking status: Never   • Smokeless tobacco: Never   • Tobacco comments:     nopassive exp   Vaping Use   • Vaping Use: Never used   Substance and Sexual Activity   • Alcohol use: Never   • Drug use: Never   • Sexual activity: Defer       ALLERGIES  Tramadol and Erythromycin      Current Facility-Administered Medications:   •  sodium chloride 0.9 % bolus 1,000 mL, 1,000 mL, Intravenous, Once, Sol Francisco, APRN, Last Rate: 1,000 mL/hr at 04/11/23 1235, 1,000 mL at 04/11/23 1235  •  sodium chloride 0.9 % bolus 1,000 mL, 1,000 mL, Intravenous, Once, Sol Francisco, APRN  •  sodium chloride 0.9 % flush 10 mL, 10 mL, Intravenous, PRN, Sol Francisco, APRN    Current Outpatient Medications:   •  albuterol sulfate  (90 Base) MCG/ACT inhaler, , Disp: , Rfl:   •  budesonide-formoterol (SYMBICORT) 160-4.5 MCG/ACT inhaler, Inhale 2 puffs., Disp: , Rfl:   •  CBD (cannabidiol) oral oil, Take  by mouth., Disp: , Rfl:   •  Cetirizine HCl 10 MG capsule, Take 10 mg by mouth Daily., Disp: , Rfl:   •  Cholecalciferol 25 MCG (1000 UT) capsule, Take 1,000 Units by mouth Daily., Disp: , Rfl:   •  esomeprazole (NexIUM)  20 MG packet, Take 20 mg by mouth Daily., Disp: , Rfl:   •  fluticasone (FLONASE) 50 MCG/ACT nasal spray, 2 sprays into the nostril(s) as directed by provider Daily., Disp: 16 g, Rfl: 0  •  ibuprofen (ADVIL,MOTRIN) 800 MG tablet, Take 800 mg by mouth Every 6 (Six) Hours As Needed for Mild Pain ., Disp: , Rfl:   •  KRILL OIL PO, Take  by mouth., Disp: , Rfl:   •  Magnesium 100 MG capsule, Take  by mouth., Disp: , Rfl:   •  multivitamin (THERAGRAN) tablet tablet, Take 1 tablet by mouth Daily., Disp: , Rfl:   •  ondansetron ODT (ZOFRAN-ODT) 4 MG disintegrating tablet, Place 1 tablet on the tongue Every 8 (Eight) Hours As Needed for Nausea or Vomiting., Disp: 15 tablet, Rfl: 0  •  Symbicort 80-4.5 MCG/ACT inhaler, , Disp: , Rfl:   •  vitamin C (ASCORBIC ACID) 250 MG tablet, Take 250 mg by mouth Daily., Disp: , Rfl:   •  Zinc 100 MG tablet, Take  by mouth., Disp: , Rfl:     PHYSICAL EXAM  ED Triage Vitals [04/11/23 1212]   Temp Heart Rate Resp BP SpO2   98.4 °F (36.9 °C) 95 18 119/78 96 %      Temp src Heart Rate Source Patient Position BP Location FiO2 (%)   Temporal Monitor Sitting Left arm --       Physical Exam  Vitals and nursing note reviewed.   Constitutional:       General: He is not in acute distress.     Appearance: Normal appearance. He is obese. He is not ill-appearing, toxic-appearing or diaphoretic.   HENT:      Head: Normocephalic and atraumatic.      Nose: Nose normal.      Mouth/Throat:      Mouth: Mucous membranes are moist.   Eyes:      Conjunctiva/sclera: Conjunctivae normal.   Cardiovascular:      Rate and Rhythm: Normal rate.      Pulses: Normal pulses.   Pulmonary:      Effort: Pulmonary effort is normal.   Abdominal:      General: Abdomen is flat. Bowel sounds are normal.      Palpations: Abdomen is soft.      Tenderness: There is generalized abdominal tenderness. There is no right CVA tenderness, left CVA tenderness or guarding.   Musculoskeletal:         General: Normal range of motion.       Cervical back: Normal range of motion.      Right lower leg: No edema.      Left lower leg: No edema.   Skin:     General: Skin is warm and dry.      Capillary Refill: Capillary refill takes less than 2 seconds.      Coloration: Skin is not jaundiced or pale.      Findings: No rash.   Neurological:      General: No focal deficit present.      Mental Status: He is alert.      Motor: No weakness.   Psychiatric:         Mood and Affect: Mood normal.         Behavior: Behavior normal.         Thought Content: Thought content normal.           LAB RESULTS  Lab Results (last 24 hours)     Procedure Component Value Units Date/Time    CBC & Differential [[id number]]  (Abnormal) Collected: 04/11/23 1229    Specimen: Blood Updated: 04/11/23 1237    Narrative:      The following orders were created for panel order CBC & Differential.  Procedure                               Abnormality         Status                     ---------                               -----------         ------                     CBC Auto Differential[[id number]]        Abnormal            Final result                 Please view results for these tests on the individual orders.    Comprehensive Metabolic Panel [[id number]]  (Abnormal) Collected: 04/11/23 1229    Specimen: Blood Updated: 04/11/23 1254     Glucose 157 mg/dL      BUN 21 mg/dL      Creatinine 0.72 mg/dL      Sodium 136 mmol/L      Potassium 3.5 mmol/L      Chloride 100 mmol/L      CO2 27.0 mmol/L      Calcium 8.1 mg/dL      Total Protein 6.6 g/dL      Albumin 4.0 g/dL      ALT (SGPT) 26 U/L      AST (SGOT) 20 U/L      Alkaline Phosphatase 75 U/L      Total Bilirubin 0.6 mg/dL      Globulin 2.6 gm/dL      A/G Ratio 1.5 g/dL      BUN/Creatinine Ratio 29.2     Anion Gap 9.0 mmol/L      eGFR 116.3 mL/min/1.73     Narrative:      GFR Normal >60  Chronic Kidney Disease <60  Kidney Failure <15      Lipase [[id number]]  (Normal) Collected: 04/11/23 1229    Specimen: Blood Updated: 04/11/23 1254      Lipase 22 U/L     CBC Auto Differential [[id number]]  (Abnormal) Collected: 04/11/23 1229    Specimen: Blood Updated: 04/11/23 1237     WBC 8.00 10*3/mm3      RBC 5.39 10*6/mm3      Hemoglobin 15.6 g/dL      Hematocrit 47.4 %      MCV 87.9 fL      MCH 28.9 pg      MCHC 32.9 g/dL      RDW 13.3 %      RDW-SD 43.3 fl      MPV 11.0 fL      Platelets 191 10*3/mm3      Neutrophil % 89.7 %      Lymphocyte % 5.0 %      Monocyte % 4.6 %      Eosinophil % 0.1 %      Basophil % 0.3 %      Immature Grans % 0.3 %      Neutrophils, Absolute 7.18 10*3/mm3      Lymphocytes, Absolute 0.40 10*3/mm3      Monocytes, Absolute 0.37 10*3/mm3      Eosinophils, Absolute 0.01 10*3/mm3      Basophils, Absolute 0.02 10*3/mm3      Immature Grans, Absolute 0.02 10*3/mm3             I ordered the above labs and reviewed the results    RADIOLOGY  No Radiology Exams Resulted Within Past 24 Hours    I ordered the above radiologic testing and reviewed the results    PROCEDURES  Procedures      PROGRESS AND CONSULTS  ED Course as of 04/11/23 1321   Tue Apr 11, 2023   1313 Discussed all findings with patient.  He states he has no further nausea at this time.  He has had no diarrhea since being in the emergency room.  He states his abdominal discomfort has resolved. [VT]   1317 Patient will finish IV fluids and will be discharged home with a prescription for Zofran. [VT]      ED Course User Index  [VT] Sol Francisco, APRN           MEDICAL DECISION MAKING    MDM      Differential includes but is not limited to gastroenteritis, food poisoning, diverticulitis.  Thank you letting us care for you today.  You have been given a prescription for for nausea and vomiting called Zofran.  Please take following package instructions for any further nausea or vomiting.  Please eat bland foods until your symptoms have resolved.  You may take Pepto-Bismol or Imodium follow package instructions should your diarrhea return.  In medicine there is always a level of  diagnostic uncertainty. Even if it is low. For this reason, immediately return to the emergency department if you have any new symptoms, worsening symptoms, change of symptoms, or if you have any other concerns. We would be happy to re-evaluate you. Otherwise, please take your medications as prescribed and follow-up as recommended. This paperwork can be taken to your primary care provider to further discuss any non-emergent lab and/or imaging findings.  DIAGNOSIS  Final diagnoses:   Gastroenteritis   Nausea and vomiting, unspecified vomiting type   Diarrhea, unspecified type       Latest Documented Vital Signs:  As of 13:21 EDT  BP- 119/78 HR- 95 Temp- 98.4 °F (36.9 °C) (Temporal) O2 sat- 96%    DISPOSITION    Discussed pertinent findings with the patient/family.  Patient/Family voiced understanding of need to follow-up for recheck and further testing as needed.  Return to the Emergency Department warnings were given.         Medication List      New Prescriptions    ondansetron ODT 4 MG disintegrating tablet  Commonly known as: ZOFRAN-ODT  Place 1 tablet on the tongue Every 8 (Eight) Hours As Needed for Nausea or Vomiting.           Where to Get Your Medications      These medications were sent to [facility] [id number] - [place], [place] - [address] [place] [address] [address]  - [phone] PH - [phone] [address] [place] [address]    Phone: [phone]   · ondansetron ODT 4 MG disintegrating tablet              Follow-up Information     Carlos Villarreal MD. Call in 2 days.    Specialty: Family Medicine  Why: As needed, If symptoms worsen  Contact information:  [address]  [address]  [place] [address]  [phone]                           Dictated utilizing Dragon dictation

## (undated) DEVICE — Device: Brand: PROSTEP™ MICA™

## (undated) DEVICE — STAPLER, SKIN, 35W, A: Brand: MEDLINE INDUSTRIES, INC.

## (undated) DEVICE — BNDG ESMARK 4IN 12FT LF STRL BLU

## (undated) DEVICE — BANDAGE,GAUZE,BULKEE II,4.5"X4.1YD,STRL: Brand: MEDLINE

## (undated) DEVICE — KT SURG TURNOVER 050

## (undated) DEVICE — GOWN,REINFORCE,POLY,SIRUS,BREATH SLV,XLG: Brand: MEDLINE

## (undated) DEVICE — APPL CHLORAPREP HI/LITE TINTED 10.5ML ORNG

## (undated) DEVICE — DRILL BIT

## (undated) DEVICE — DRILL BIT: Brand: DART-FIRE

## (undated) DEVICE — SOLUTION,WATER,IRRIGATION,1000ML,STERILE: Brand: MEDLINE

## (undated) DEVICE — PK EXTREM 50

## (undated) DEVICE — DRSNG GZ PETROLTM CURAD NONADHR 1/2X72IN LF STRL

## (undated) DEVICE — DRSNG GZ PETROLTM CURAD OVRWRP 1/2X72IN STRL

## (undated) DEVICE — INTENDED FOR TISSUE SEPARATION, AND OTHER PROCEDURES THAT REQUIRE A SHARP SURGICAL BLADE TO PUNCTURE OR CUT.: Brand: BARD-PARKER ® CARBON RIB-BACK BLADES

## (undated) DEVICE — COUNTERSINK: Brand: DART-FIRE

## (undated) DEVICE — DRAPE,U/ SHT,SPLIT,PLAS,STERIL: Brand: MEDLINE

## (undated) DEVICE — SUT VIC 3/0 FS1 27IN J442H

## (undated) DEVICE — CUFF TOURNI 1BLADDER 1PRT 24IN STRL

## (undated) DEVICE — SLV SCD CALF HEMOFORCE DVT THERP REPROC MD

## (undated) DEVICE — SOL IRRIG NACL 1000ML

## (undated) DEVICE — GLV SURG BIOGEL M LTX PF 7 1/2

## (undated) DEVICE — SPNG GZ WOVN 4X4IN 12PLY 10/BX STRL

## (undated) DEVICE — SUT VIC 2/0 SH 27IN

## (undated) DEVICE — DRSNG GZ PETROLTM CURAD 1X8IN STRL

## (undated) DEVICE — C-ARM: Brand: UNBRANDED

## (undated) DEVICE — OCCLUSIVE GAUZE STRIP OVERWRAP,3% BISMUTH TRIBROMOPHENATE IN PETROLATUM BLEND: Brand: XEROFORM

## (undated) DEVICE — UNDERGLV SURG BIOGEL INDICAT PI SZ8 BLU

## (undated) DEVICE — PENCL HND ROCKRSWTCH HOLSTR EZ CLEAN TP CRD 10FT